# Patient Record
Sex: MALE | Race: WHITE
[De-identification: names, ages, dates, MRNs, and addresses within clinical notes are randomized per-mention and may not be internally consistent; named-entity substitution may affect disease eponyms.]

---

## 2019-09-25 ENCOUNTER — HOSPITAL ENCOUNTER (EMERGENCY)
Dept: HOSPITAL 61 - ER | Age: 19
Discharge: HOME | End: 2019-09-25
Payer: COMMERCIAL

## 2019-09-25 VITALS — WEIGHT: 163 LBS | HEIGHT: 71 IN | BODY MASS INDEX: 22.82 KG/M2

## 2019-09-25 VITALS — SYSTOLIC BLOOD PRESSURE: 133 MMHG | DIASTOLIC BLOOD PRESSURE: 56 MMHG

## 2019-09-25 DIAGNOSIS — R05: ICD-10-CM

## 2019-09-25 DIAGNOSIS — K52.9: Primary | ICD-10-CM

## 2019-09-25 LAB
% BANDS: 2 % (ref 0–9)
% LYMPHS: 4 % (ref 24–48)
% MONOS: 4 % (ref 0–10)
% SEGS: 89 % (ref 35–66)
ACETAMIN: < 2 MCG/ML (ref 10–30)
ALBUMIN SERPL-MCNC: 5.3 G/DL (ref 3.4–5)
ALBUMIN/GLOB SERPL: 1.4 {RATIO} (ref 1–1.7)
ALP SERPL-CCNC: 67 U/L (ref 46–116)
ALT SERPL-CCNC: 26 U/L (ref 16–63)
ANION GAP SERPL CALC-SCNC: 13 MMOL/L (ref 6–14)
AST SERPL-CCNC: 25 U/L (ref 15–37)
BASOPHILS # BLD AUTO: 0.1 X10^3/UL (ref 0–0.2)
BASOPHILS NFR BLD: 1 % (ref 0–3)
BILIRUB SERPL-MCNC: 1.8 MG/DL (ref 0.2–1)
BUN SERPL-MCNC: 10 MG/DL (ref 8–26)
BUN/CREAT SERPL: 10 (ref 6–20)
CALCIUM SERPL-MCNC: 10.2 MG/DL (ref 8.5–10.1)
CHLORIDE SERPL-SCNC: 104 MMOL/L (ref 98–107)
CO2 SERPL-SCNC: 26 MMOL/L (ref 21–32)
CREAT SERPL-MCNC: 1 MG/DL (ref 0.7–1.3)
EOSINOPHIL NFR BLD AUTO: 1 % (ref 0–5)
EOSINOPHIL NFR BLD: 0.2 X10^3/UL (ref 0–0.7)
EOSINOPHIL NFR BLD: 1 % (ref 0–3)
ERYTHROCYTE [DISTWIDTH] IN BLOOD BY AUTOMATED COUNT: 12.9 % (ref 11.5–14.5)
GFR SERPLBLD BASED ON 1.73 SQ M-ARVRAT: 95.3 ML/MIN
GLOBULIN SER-MCNC: 3.7 G/DL (ref 2.2–3.8)
GLUCOSE SERPL-MCNC: 119 MG/DL (ref 70–99)
HCT VFR BLD CALC: 44.4 % (ref 39–53)
HGB BLD-MCNC: 15.6 G/DL (ref 13–17.5)
INFLUENZA A PATIENT: NEGATIVE
INFLUENZA B PATIENT: NEGATIVE
LYMPHOCYTES # BLD: 0.5 X10^3/UL (ref 1–4.8)
LYMPHOCYTES NFR BLD AUTO: 3 % (ref 24–48)
MCH RBC QN AUTO: 33 PG (ref 25–35)
MCHC RBC AUTO-ENTMCNC: 35 G/DL (ref 31–37)
MCV RBC AUTO: 95 FL (ref 79–100)
MONO #: 0.7 X10^3/UL (ref 0–1.1)
MONOCYTES NFR BLD: 4 % (ref 0–9)
NEUT #: 14.6 X10^3/UL (ref 1.8–7.7)
NEUTROPHILS NFR BLD AUTO: 91 % (ref 31–73)
PLATELET # BLD AUTO: 252 X10^3/UL (ref 140–400)
PLATELET # BLD EST: ADEQUATE 10*3/UL
POTASSIUM SERPL-SCNC: 3.3 MMOL/L (ref 3.5–5.1)
PROT SERPL-MCNC: 9 G/DL (ref 6.4–8.2)
RBC # BLD AUTO: 4.69 X10^6/UL (ref 4.3–5.7)
SODIUM SERPL-SCNC: 143 MMOL/L (ref 136–145)
WBC # BLD AUTO: 16.1 X10^3/UL (ref 4–11)

## 2019-09-25 PROCEDURE — 36415 COLL VENOUS BLD VENIPUNCTURE: CPT

## 2019-09-25 PROCEDURE — 87804 INFLUENZA ASSAY W/OPTIC: CPT

## 2019-09-25 PROCEDURE — 85025 COMPLETE CBC W/AUTO DIFF WBC: CPT

## 2019-09-25 PROCEDURE — 96361 HYDRATE IV INFUSION ADD-ON: CPT

## 2019-09-25 PROCEDURE — 94640 AIRWAY INHALATION TREATMENT: CPT

## 2019-09-25 PROCEDURE — 99285 EMERGENCY DEPT VISIT HI MDM: CPT

## 2019-09-25 PROCEDURE — G0480 DRUG TEST DEF 1-7 CLASSES: HCPCS

## 2019-09-25 PROCEDURE — 80053 COMPREHEN METABOLIC PANEL: CPT

## 2019-09-25 PROCEDURE — 80329 ANALGESICS NON-OPIOID 1 OR 2: CPT

## 2019-09-25 PROCEDURE — 96374 THER/PROPH/DIAG INJ IV PUSH: CPT

## 2019-09-25 PROCEDURE — 74177 CT ABD & PELVIS W/CONTRAST: CPT

## 2019-09-25 PROCEDURE — 71045 X-RAY EXAM CHEST 1 VIEW: CPT

## 2019-09-25 PROCEDURE — 85007 BL SMEAR W/DIFF WBC COUNT: CPT

## 2019-09-25 NOTE — RAD
Examination: CT of the abdomen pelvis with IV contrast

 

HISTORY: History of abdominal pain, nausea, vomiting

 

COMPARISON: None available

 

TECHNIQUE: Axial CT images of the abdomen pelvis were performed with IV 

contrast. Coronal and sagittal reformats are performed

 

Exposure: One or more of the following individualized dose reduction 

techniques were utilized for this examination:  1. Automated exposure 

control  2. Adjustment of the mA and/or kV according to patient size  3. 

Use of iterative reconstruction technique

 

FINDINGS:

 

The bibasilar lungs are clear. No evidence of free air identified in the 

abdomen.

 

The liver, spleen, adrenals grossly appears unremarkable. The gallbladder 

is mildly distended. The stomach is mildly distended. The visualized 

pancreas grossly appears unremarkable. The small bowel is nondilated.

 

Moderate thickened appearance of the wall of the colon throughout with 

surrounding inflammatory fat stranding likely diffuse colitis.

 

The appendix is not clearly evident. The distal small bowel loop is mildly

fluid distended.

 

The bilateral kidneys enhance symmetrically. The caliber of the aorta 

grossly appears unremarkable.

 

No evidence of lytic bony destructive lesion.

 

 

IMPRESSION:

 

1. Diffuse moderate thickening of the wall of the colon with surrounding 

inflammatory fat stranding likely diffuse colitis.

 

 

 

Electronically signed by: Clayton Ayala MD (9/25/2019 2:37 AM) [facility]

## 2019-09-25 NOTE — RAD
EXAM: CHEST 1 VIEW 

 

History: Cough, shortness of breath 

 

COMPARISON: None available.

 

TECHNIQUE: Single portable radiograph of the chest

 

FINDINGS:  The cardiac silhouette is unremarkable. The lungs are clear 

bilaterally. The costophrenic sulci are clear and well demarcated.

 

IMPRESSION:  No radiographic evidence of an acute cardiopulmonary process.

 

 

Electronically signed by: Clayton Ayala MD (9/25/2019 5:03 AM) [facility]

## 2019-09-25 NOTE — PHYS DOC
Past Medical History


Past Medical History:  No Pertinent History


Past Surgical History:  No Surgical History


Alcohol Use:  Rarely


Drug Use:  None





Adult General


Chief Complaint


Chief Complaint:  NAUSEA/VOMITING/DIARRHA





HPI


HPI


19-year-old male presents to the emergency department with vomiting. Patient 

states are partially 5:30 PM tonight, described as bile yellow. He has had cough

as well as wheezing, diarrhea, abdominal discomfort as well as fever up to 

102.3. Patient states he's been taking Tylenol approximately 2000 mg by mouth 

every 4 hours. Given his continued symptoms presents to the ER for further 

evaluation. Patient denies any headache or visual changes, denies any rash





All other ROS negative unless documented in HPI





Review of Systems


Review of Systems


See Above





Current Medications


Current Medications





Current Medications








 Medications


  (Trade)  Dose


 Ordered  Sig/Camryn  Start Time


 Stop Time Status Last Admin


Dose Admin


 


 Albuterol/


 Ipratropium


  (Duoneb)  3 ml  1X  ONCE  19 01:15


 19 01:16 DC 19 01:32


3 ML


 


 Info


  (CONTRAST GIVEN


 -- Rx MONITORING)  1 each  PRN DAILY  PRN  19 02:15


 19 02:14   





 


 Iohexol


  (Omnipaque 300


 Mg/ml)  75 ml  1X  ONCE  19 02:15


 19 02:16 DC 19 02:14


75 ML


 


 Ondansetron HCl


  (Zofran)  4 mg  1X  ONCE  19 01:30


 19 01:31 DC 19 01:22


4 MG


 


 Sodium Chloride  1,000 ml @ 


 1,000 mls/hr  1X  ONCE  19 01:15


 19 02:14 DC 19 01:22


1,000 MLS/HR











Allergies


Allergies





Allergies








Coded Allergies Type Severity Reaction Last Updated Verified


 


  No Known Drug Allergies    19 No











Physical Exam


Physical Exam





Constitutional: Well developed, well nourished, no acute distress, non-toxic 

appearance. []


HENT: Normocephalic, atraumatic, bilateral external ears normal, oropharynx 

moist, no oral exudates, nose normal. []


Eyes: PERRLA, EOMI, conjunctiva normal, no discharge. [] 


Cardiovascular:Heart rate regular rhythm, no murmur []


Lungs & Thorax:  Bilateral breath sounds clear to auscultation []


Abdomen: Office, generalized tenderness, no right lower quadrant pain 

appreciated


Skin: Warm, dry, no erythema, no rash. [] 


Extremities: No tenderness, no cyanosis, no clubbing, ROM intact, no edema. [] 


Neurologic: Alert and oriented X 3, no focal deficits noted. []


Psychologic: Affect normal, judgement normal, mood normal. []





Current Patient Data


Vital Signs





                                   Vital Signs








  Date Time  Temp Pulse Resp B/P (MAP) Pulse Ox O2 Delivery O2 Flow Rate FiO2


 


19 02:06  89  111/77 (88) 97 Room Air  


 


19 00:29 97.7  17     





 97.7       








Lab Values





                                Laboratory Tests








Test


 19


00:45 19


01:14


 


White Blood Count


 16.1 x10^3/uL


(4.0-11.0)  H 





 


Red Blood Count


 4.69 x10^6/uL


(4.30-5.70) 





 


Hemoglobin


 15.6 g/dL


(13.0-17.5) 





 


Hematocrit


 44.4 %


(39.0-53.0) 





 


Mean Corpuscular Volume


 95 fL ()


 





 


Mean Corpuscular Hemoglobin 33 pg (25-35)   


 


Mean Corpuscular Hemoglobin


Concent 35 g/dL


(31-37) 





 


Red Cell Distribution Width


 12.9 %


(11.5-14.5) 





 


Platelet Count


 252 x10^3/uL


(140-400) 





 


Neutrophils (%) (Auto) 91 % (31-73)  H 


 


Lymphocytes (%) (Auto) 3 % (24-48)  L 


 


Monocytes (%) (Auto) 4 % (0-9)   


 


Eosinophils (%) (Auto) 1 % (0-3)   


 


Basophils (%) (Auto) 1 % (0-3)   


 


Neutrophils # (Auto)


 14.6 x10^3/uL


(1.8-7.7)  H 





 


Lymphocytes # (Auto)


 0.5 x10^3/uL


(1.0-4.8)  L 





 


Monocytes # (Auto)


 0.7 x10^3/uL


(0.0-1.1) 





 


Eosinophils # (Auto)


 0.2 x10^3/uL


(0.0-0.7) 





 


Basophils # (Auto)


 0.1 x10^3/uL


(0.0-0.2) 





 


Segmented Neutrophils % 89 % (35-66)  H 


 


Band Neutrophils % 2 % (0-9)   


 


Lymphocytes % 4 % (24-48)  L 


 


Monocytes % 4 % (0-10)   


 


Eosinophils % 1 % (0-5)   


 


Platelet Estimate


 Adequate


(ADEQUATE) 





 


Sodium Level


 143 mmol/L


(136-145) 





 


Potassium Level


 3.3 mmol/L


(3.5-5.1)  L 





 


Chloride Level


 104 mmol/L


() 





 


Carbon Dioxide Level


 26 mmol/L


(21-32) 





 


Anion Gap 13 (6-14)   


 


Blood Urea Nitrogen


 10 mg/dL


(8-26) 





 


Creatinine


 1.0 mg/dL


(0.7-1.3) 





 


Estimated GFR


(Cockcroft-Gault) 95.3  


 





 


BUN/Creatinine Ratio 10 (6-20)   


 


Glucose Level


 119 mg/dL


(70-99)  H 





 


Calcium Level


 10.2 mg/dL


(8.5-10.1)  H 





 


Total Bilirubin


 1.8 mg/dL


(0.2-1.0)  H 





 


Aspartate Amino Transferase


(AST) 25 U/L (15-37)


 





 


Alanine Aminotransferase (ALT)


 26 U/L (16-63)


 





 


Alkaline Phosphatase


 67 U/L


() 





 


Total Protein


 9.0 g/dL


(6.4-8.2)  H 





 


Albumin


 5.3 g/dL


(3.4-5.0)  H 





 


Albumin/Globulin Ratio 1.4 (1.0-1.7)   


 


Acetaminophen Level


 < 2 mcg/ml


(10-30)  L 





 


Acetaminophen Last Dose Date    


 


Acetaminophen Last Dose Time    


 


Influenza Type A Antigen


 


 Negative


(NEGATIVE)


 


Influenza Type B Antigen


 


 Negative


(NEGATIVE)





                                Laboratory Tests


19 00:45








                                Laboratory Tests


19 00:45














EKG


EKG


[]





Radiology/Procedures


Radiology/Procedures


[facility]


                    [address]  [place], [place] [id number] [phone]


                                        


                                 IMAGING REPORT





                                     Signed





PATIENT: TAD TAVERAS   ACCOUNT: [id number]     MRN#: [id number]


: 2000           LOCATION: ER              AGE: 19


SEX: M                    EXAM DT: 19         ACCESSION#: [id number]


STATUS: REG ER            ORD. PHYSICIAN: MARY JO GOMEZ MD


REASON: abdominal pain, nausea with vomiting, 16k wbc


PROCEDURE: CT ABD PELV W/ IV CONTRST ONLY





Examination: CT of the abdomen pelvis with IV contrast


 


HISTORY: History of abdominal pain, nausea, vomiting


 


COMPARISON: None available


 


TECHNIQUE: Axial CT images of the abdomen pelvis were performed with IV 


contrast. Coronal and sagittal reformats are performed


 


Exposure: One or more of the following individualized dose reduction 


techniques were utilized for this examination:  1. Automated exposure 


control  2. Adjustment of the mA and/or kV according to patient size  3. 


Use of iterative reconstruction technique


 


FINDINGS:


 


The bibasilar lungs are clear. No evidence of free air identified in the 


abdomen.


 


The liver, spleen, adrenals grossly appears unremarkable. The gallbladder 


is mildly distended. The stomach is mildly distended. The visualized 


pancreas grossly appears unremarkable. The small bowel is nondilated.


 


Moderate thickened appearance of the wall of the colon throughout with 


surrounding inflammatory fat stranding likely diffuse colitis.


 


The appendix is not clearly evident. The distal small bowel loop is mildly


fluid distended.


 


The bilateral kidneys enhance symmetrically. The caliber of the aorta 


grossly appears unremarkable.


 


No evidence of lytic bony destructive lesion.


 


 


IMPRESSION:


 


1. Diffuse moderate thickening of the wall of the colon with surrounding 


inflammatory fat stranding likely diffuse colitis.


 


 


 


Electronically signed by: Clayton Ayala MD (2019 2:37 AM) [facility]














DICTATED and SIGNED BY:     CLAYTON AYALA MD


DATE:     19


[]





Course & Med Decision Making


Course & Med Decision Making


Pertinent Labs and Imaging studies reviewed. (See chart for details)





[]19-year-old male presents to the emergency department with vomiting. Patient 

states are partially 5:30 PM tonight, described as bile yellow. He has had cough

 as well as wheezing, diarrhea, abdominal discomfort as well as fever up to 

102.3. Patient states he's been taking Tylenol approximately 2000 mg by mouth 

every 4 hours. Given his continued symptoms presents to the ER for further 

evaluation. Patient denies any headache or visual changes, denies any rash





Labs reviewed. White blood cell count 16,000, metabolic profile reveals 

hypokalemia at 3.3. Patient provided IV fluids with improvement of tachycardia. 

He is afebrile on examination. States he feels better after IV fluids and nausea

 medicine. At this time after review his CT scan he has evidence of colitis 

diffuse, would recommend Cipro, Flagyl and Vicodin therapy will attempt by mouth

 challenge in the emergency department if tolerates will plan for discharge home

 with by mouth metabolic therapy and Zofran as an outpatient. If he is unable 

tolerate by mouth plan for admission to the hospital for observation. Discussed 

findings with patient at bedside.





Dragon Disclaimer


Fedeon Disclaimer


This electronic medical record was generated, in whole or in part, using a voice

 recognition dictation system.





Departure


Departure


Impression:  


   Primary Impression:  


   Colitis


   Additional Impression:  


   Nausea & vomiting


Disposition:   HOME, SELF-CARE


Condition:  IMPROVED


Referrals:  


NO PCP (PCP)


Patient Instructions:  Colitis





Additional Instructions:  


Recommend follow up with PCP 3 - 5 days


Return to the ER with worsening symptoms, intractable pain, fever, altered 

mental status


Motrin as needed for pain


Take antibioitics as directed


Zofran as needed for nausea


Scripts


Albuterol Sulfate (VENTOLIN HFA INHALER) 18 Gm Hfa.aer.ad


2 PUFF INH Q4HRS for FOR ASTHMA, #1 INHALER 0 Refills


   Prov: MARY JO GOMEZ MD         19 


Ondansetron Hcl (ZOFRAN) 4 Mg Tablet


1 TAB PO PRN Q6-8HRS, #12 TAB


   Prov: MARY JO GOMEZ MD         19 


Metronidazole (FLAGYL) 500 Mg Tablet


1 TAB PO TID for 7 Days, #21 TAB


   Prov: MARY JO GOMEZ MD         19 


Ciprofloxacin Hcl (CIPRO) 250 Mg Tablet


2 TAB PO BID for infection for 7 Days, #28 TAB


   Prov: MARY JO GOMEZ MD         19





Problem Qualifiers








   Additional Impression:  


   Nausea & vomiting


   Vomiting type:  bilious vomiting  Qualified Codes:  R11.14 - Bilious vomiting








MARY JO GOMEZ MD              Sep 25, 2019 01:14